# Patient Record
(demographics unavailable — no encounter records)

---

## 2017-10-06 NOTE — RAD
CT of the head without contrast, 10/6/2017:



History: Headache



The ventricles are within normal limits in size. There is no shift of the

midline structures. There is no evidence of acute intracranial hemorrhage or

mass effect.



IMPRESSION: No acute intracranial abnormality is detected.











PQRS Compliance Statement:



One or more of the following individualized dose reduction techniques were

utilized for this examination:

1. Automated exposure control

2. Adjustment of the mA and/or kV according to patient size

3. Use of iterative reconstruction technique

## 2017-10-06 NOTE — PHYS DOC
Past Medical History


Past Medical History:  No Pertinent History


Past Surgical History:  Cholecystectomy


Additional Past Surgical Histo:  R WRIST SX


Alcohol Use:  None


Drug Use:  None





Adult General


Chief Complaint


Chief Complaint:  HEADACHE





HPI


HPI





Patient is a 23  year old female presents the ED complaining of headache 4 

days. Patient states she [facility] days ago was given some 

medicine for her headache and has had no improvement. States she took the 

medicine one time and has not taken anything today. Describes the pain as 

sharp. Rates the pain as 9 out of 10. Complains of nausea. Denies head injury, 

neck injury, LOC, vision changes, vomiting, weakness, chest pain, fever or 

shortness of breath.





Review of Systems


Review of Systems





Constitutional: Denies fever or chills []


Eyes: Denies change in visual acuity, redness, or eye pain []


HENT: Denies nasal congestion or sore throat []


Respiratory: Denies cough or shortness of breath []


Cardiovascular: No additional information not addressed in HPI []


GI: Complains of nausea Denies abdominal pain, vomiting, bloody stools or 

diarrhea []


: Denies dysuria or hematuria []


Musculoskeletal: Denies back pain or joint pain []


Integument: Denies rash or skin lesions []


Neurologic: Complains of headache. Denies focal weakness or sensory changes []


Endocrine: Denies polyuria or polydipsia []





Current Medications


Current Medications





Current Medications








 Medications


  (Trade)  Dose


 Ordered  Sig/[facility]  Start Time


 Stop Time Status Last Admin


Dose Admin


 


 Acetaminophen/


 Butalbital/


 Caffeine


  (Fioricet)  1 tab  1X  ONCE  10/6/17 13:00


 10/6/17 13:00 DC 10/6/17 12:52


1 TAB


 


 Acetaminophen/


 Hydrocodone Bitart


  (Lortab 5/325)  1 tab  1X  ONCE  10/6/17 11:30


 10/6/17 11:41 DC 10/6/17 11:49


1 TAB











Allergies


Allergies





Allergies








Coded Allergies Type Severity Reaction Last Updated Verified


 


  No Known Drug Allergies    10/6/17 No











Physical Exam


Physical Exam





Constitutional: Well developed, well nourished, no acute distress, non-toxic 

appearance. []


HENT: Normocephalic, atraumatic, bilateral external ears normal, oropharynx 

moist, no oral exudates, nose normal. []


Eyes: PERRLA, EOMI, conjunctiva normal, no discharge. [] 


Neck: Normal range of motion, no tenderness, supple, no stridor. [] 


Cardiovascular:Heart rate regular rhythm, no murmur []


Lungs & Thorax:  Bilateral breath sounds clear to auscultation []


Abdomen: Bowel sounds normal, soft, no tenderness, no masses, no pulsatile 

masses. [] 


Skin: Warm, dry, no erythema, no rash. [] 


Back: No tenderness, no CVA tenderness. [] 


Extremities: No tenderness, no cyanosis, no clubbing, ROM intact, no edema. [] 


Neurologic: Alert and oriented X 3, normal motor function, normal sensory 

function, no focal deficits noted. []


Psychologic: Affect normal, judgement normal, mood normal. []





Current Patient Data


Vital Signs





 Vital Signs








  Date Time  Temp Pulse Resp B/P (MAP) Pulse Ox O2 Delivery O2 Flow Rate FiO2


 


10/6/17 11:49   18  99 Room Air  


 


10/6/17 11:41 98.5 74      





 98.5       











EKG


EKG


[]





Radiology/Procedures


Radiology/Procedures


[]


Patient requesting imaging of the brain. CT imaging completed. No acute 

abnormality. No focal neural deficits. Patient's headache improved. Recommended 

lumbar puncture. Patient refused. Discussed risks. Patient verbalizes 

understanding. No signs of nuchal rigidity or meningitis. Discussed follow-up 

with neurology in 1-2 days. Provided contact information/education. Discussed 

reasons to return to the ED. Patient understands and agrees with plan.





Course & Med Decision Making


Course & Med Decision Making


Pertinent Labs and Imaging studies reviewed. (See chart for details)





[]





Dragon Disclaimer


Dragon Disclaimer


This electronic medical record was generated, in whole or in part, using a 

voice recognition dictation system.





Departure


Departure


Impression:  


 Primary Impression:  


 Headache


Disposition:  01 HOME, SELF-CARE


Condition:  IMPROVED


Patient Instructions:  General Headache Without Cause


Scripts


Ibuprofen (IBUPROFEN) 800 Mg Tablet


800 MG PO PRN Q6HRS Y for INFLAMMATION, #20 TAB


   Prov: SHERIN SHEIKH         10/6/17











SHERIN SHEIKH Oct 6, 2017 12:21

## 2017-10-19 NOTE — PHYS DOC
Past Medical History


Past Medical History:  No Pertinent History


Past Surgical History:  Cholecystectomy


Additional Past Surgical Histo:  R WRIST SX


Alcohol Use:  None


Drug Use:  None





Adult General


Chief Complaint


Chief Complaint:  HEADACHE





HPI


HPI





Patient is a 23  year old female with a history of headaches presents to the ED 

complaining of headache since last night. Seen in our ED on 10/6 for similar 

symptoms.  CT of the head was negative. Headache improved with meds in ED. 

Patient states headache same as previous headaches. Describes as sharp, rates 

as 8/10. Denies worst headache of life, chest pain, neck pain, shortness of 

breath, dizziness, weakness, n/v, abdominal pain or vision changes.





Review of Systems


Review of Systems





Constitutional: Denies fever or chills []


Eyes: Denies change in visual acuity, redness, or eye pain []


HENT: Denies nasal congestion or sore throat []


Respiratory: Denies cough or shortness of breath []


Cardiovascular: No additional information not addressed in HPI []


GI: Denies abdominal pain, nausea, vomiting, bloody stools or diarrhea []


: Denies dysuria or hematuria []


Musculoskeletal: Denies back pain or joint pain []


Integument: Denies rash or skin lesions []


Neurologic: Complains of headache. Denies focal weakness or sensory changes []


Endocrine: Denies polyuria or polydipsia []





Current Medications


Current Medications





Current Medications








 Medications


  (Trade)  Dose


 Ordered  Sig/[facility]  Start Time


 Stop Time Status Last Admin


Dose Admin


 


 Ketorolac


 Tromethamine


  (Toradol Im)  60 mg  1X  ONCE  10/19/17 10:30


 10/19/17 10:31 DC 10/19/17 10:16


60 MG


 


 Lidocaine HCl


  (Xylocaine-Mpf


 1% Vial)  4 ml  1X  ONCE  10/19/17 10:30


 10/19/17 10:31 DC 10/19/17 10:26


2 ML


 


 Ondansetron HCl


  (Zofran Odt)  4 mg  1X  ONCE  10/19/17 10:30


 10/19/17 10:31 DC 10/19/17 10:16


4 MG











Allergies


Allergies





Allergies








Coded Allergies Type Severity Reaction Last Updated Verified


 


  No Known Drug Allergies    10/6/17 No











Physical Exam


Physical Exam





Constitutional: Well developed, well nourished, no acute distress, non-toxic 

appearance. []


HENT: Normocephalic, atraumatic, bilateral external ears normal, oropharynx 

moist, no oral exudates, nose normal. []


Eyes: PERRLA, EOMI, conjunctiva normal, no discharge. [] 


Neck: Normal range of motion, no tenderness, supple, no stridor. [] 


Cardiovascular:Heart rate regular rhythm, no murmur []


Lungs & Thorax:  Bilateral breath sounds clear to auscultation []


Abdomen: Bowel sounds normal, soft, no tenderness, no masses, no pulsatile 

masses. [] 


Skin: Warm, dry, no erythema, no rash. [] 


Back: No tenderness, no CVA tenderness. [] 


Extremities: No tenderness, no cyanosis, no clubbing, ROM intact, no edema. [] 


Neurologic: Alert and oriented X 3, normal motor function, normal sensory 

function, no focal deficits noted. []


Psychologic: Affect normal, judgement normal, mood normal. []





Current Patient Data


Vital Signs





 Vital Signs








  Date Time  Temp Pulse Resp B/P (MAP) Pulse Ox O2 Delivery O2 Flow Rate FiO2


 


10/19/17 11:26  68   96   


 


10/19/17 10:56   16     


 


10/19/17 09:45 98.0     Room Air  





 98.0       








Lab Values





 Laboratory Tests








Test


  10/19/17


09:55 10/19/17


09:59


 


Urine Collection Type Unknown   


 


Urine Color Yellow   


 


Urine Clarity Cloudy   


 


Urine pH 5.5   


 


Urine Specific Gravity 1.025   


 


Urine Protein


  Negative mg/dL


(NEG-TRACE) 


 


 


Urine Glucose (UA)


  Negative mg/dL


(NEG) 


 


 


Urine Ketones (Stick)


  Negative mg/dL


(NEG) 


 


 


Urine Blood


  Negative (NEG)


  


 


 


Urine Nitrite


  Negative (NEG)


  


 


 


Urine Bilirubin


  Negative (NEG)


  


 


 


Urine Urobilinogen Dipstick


  0.2 mg/dL (0.2


mg/dL) 


 


 


Urine Leukocyte Esterase


  Negative (NEG)


  


 


 


Urine RBC


  Occ /HPF (0-2)


  


 


 


Urine WBC


  1-4 /HPF (0-4)


  


 


 


Urine Squamous Epithelial


Cells Many /LPF  


  


 


 


Urine Bacteria


  Mod /HPF


(0-FEW) 


 


 


Urine Mucus Marked /LPF   


 


POC Urine HCG, Qualitative


  


  Hcg negative


(Negative)











EKG


EKG


[]





Radiology/Procedures


Radiology/Procedures


[]





Course & Med Decision Making


Course & Med Decision Making


Pertinent Labs and Imaging studies reviewed. (See chart for details)





[]Patient's headache improved with nebulized lidocaine. No focal neural 

deficits. States same headache as previous headaches Discussed follow-up with 

neurology in 1-2 days. Provided contact information/education. Discussed 

reasons to return to the ED. Patient understands and agrees with plan.





Dragon Disclaimer


Dragon Disclaimer


This electronic medical record was generated, in whole or in part, using a 

voice recognition dictation system.





Departure


Departure


Impression:  


 Primary Impression:  


 Headache


Disposition:  01 HOME, SELF-CARE


Condition:  IMPROVED


Referrals:  


NO PCP (PCP)








MANUEL ROBBINS MD


Patient Instructions:  Migraine Headache


Scripts


Butalb/Acetaminophen/Caffeine ([id number] -40) 1 Each Capsule


1 EACH PO Q4-6HRS Y for HEADACHE, #15 CAP


   Prov: SHERIN SHEIKH         10/19/17











SHERIN SHEIKH Oct 19, 2017 10:16

## 2017-12-07 NOTE — PHYS DOC
Past Medical History


Past Medical History:  No Pertinent History


Past Surgical History:  Cholecystectomy


Additional Past Surgical Histo:  R WRIST SX


Alcohol Use:  None


Drug Use:  None





Adult General


Chief Complaint


Chief Complaint:  ELBOW PROBLEM





HPI


HPI





Patient is a 23  year old female who presents with mild left elbow pain that 

began today after she tripped on herself and fell. Patient denies any loss of 

consciousness. She states her pain is worse on flexion and extension on the 

pain is intermittent.





Review of Systems


Review of Systems





Constitutional: Denies fever or chills []


Musculoskeletal: Left elbow pain


Integument: Denies rash or skin lesions []


Neurologic: Denies headache, focal weakness or sensory changes []





All other systems were reviewed and found to be within normal limits, except as 

documented in this note.





Allergies


Allergies





Allergies








Coded Allergies Type Severity Reaction Last Updated Verified


 


  No Known Drug Allergies    10/6/17 No











Physical Exam


Physical Exam





Constitutional: Well developed, well nourished, no acute distress, non-toxic 

appearance. []


Skin: Warm, dry, no erythema, no rash. [] 


Back: No tenderness, no CVA tenderness. [] 


Extremities: Left elbow with no obvious deformity, no bruising. No tenderness 

on palpation of the elbow. Full range of motion to the left elbow including 

flexion and extension of the elbow and  plantar flexion and dorsiflexion of the 

left forearm. +2 left radial pulse. Adequate radial medial and ulnar sensation 

to the left forearm. Cap refill less than 2 seconds the left fingers.


Neurologic: Alert and oriented X 3, normal motor function, normal sensory 

function, no focal deficits noted. []


Psychologic: Affect normal, judgement normal, mood normal. []





Current Patient Data


Vital Signs





 Vital Signs








  Date Time  Temp Pulse Resp B/P (MAP) Pulse Ox O2 Delivery O2 Flow Rate FiO2


 


12/7/17 20:03 98.2 82 18  99 Room Air  





 98.2       











EKG


EKG


[]





Radiology/Procedures


Radiology/Procedures


[]





Course & Med Decision Making


Course & Med Decision Making


Pertinent Labs and Imaging studies reviewed. (See chart for details)





Patient is in the ED with left elbow pain after falling. Left elbow x-rays 

interpreted by Dr. Son are negative for any acute findings. Discharged with 

naproxen. Ice elevation encouraged. Follow-up with orthopedic doctor in one 

week if pain continues.





Dragon Disclaimer


Dragon Disclaimer


This electronic medical record was generated, in whole or in part, using a 

voice recognition dictation system.





Departure


Departure


Impression:  


 Primary Impression:  


 Fall from standing


 Additional Impression:  


 Left elbow contusion


Disposition:  01 HOME, SELF-CARE


Condition:  STABLE


Referrals:  


NO PCP (PCP)








ANN DANIELS MD


follow up in one week


Patient Instructions:  Contusion, Easy-to-Read, Fall Prevention and Home Safety





Additional Instructions:  


You were seen with left elbow contusion. Ice elevate the extremity. Take the 

prescribed medicines as needed for pain. Follow-up with orthopedic doctor 

provided or your own doctor in 1-2 weeks if pain continues.


Scripts


Naproxen (NAPROXEN) 375 Mg Tablet


1 TAB PO BID, #30 TAB 0 Refills


   Prov: ELIZABETH GERONIMO         12/7/17





Problem Qualifiers








 Primary Impression:  


 Fall from standing


 Encounter type:  initial encounter  Qualified Codes:  W19.XXXA - Unspecified 

fall, initial encounter


 Additional Impression:  


 Left elbow contusion


 Encounter type:  initial encounter  Qualified Codes:  S50.02XA - Contusion of 

left elbow, initial encounter








ELIZABETH GERONIMO Dec 7, 2017 20:52

## 2017-12-08 NOTE — RAD
Left elbow, 3 views, 12/7/2017:



History: Fall, pain



No fracture or dislocation is identified. There is no radiographic evidence of

an elbow joint effusion.



IMPRESSION: No acute abnormality is detected.